# Patient Record
Sex: FEMALE | Race: ASIAN | NOT HISPANIC OR LATINO
[De-identification: names, ages, dates, MRNs, and addresses within clinical notes are randomized per-mention and may not be internally consistent; named-entity substitution may affect disease eponyms.]

---

## 2023-02-14 ENCOUNTER — APPOINTMENT (OUTPATIENT)
Dept: UROLOGY | Facility: CLINIC | Age: 33
End: 2023-02-14
Payer: COMMERCIAL

## 2023-02-14 VITALS
TEMPERATURE: 98.6 F | HEART RATE: 83 BPM | SYSTOLIC BLOOD PRESSURE: 119 MMHG | WEIGHT: 129 LBS | HEIGHT: 61 IN | BODY MASS INDEX: 24.35 KG/M2 | DIASTOLIC BLOOD PRESSURE: 78 MMHG

## 2023-02-14 PROBLEM — Z00.00 ENCOUNTER FOR PREVENTIVE HEALTH EXAMINATION: Status: ACTIVE | Noted: 2023-02-14

## 2023-02-14 PROCEDURE — 99204 OFFICE O/P NEW MOD 45 MIN: CPT

## 2023-02-14 PROCEDURE — 51798 US URINE CAPACITY MEASURE: CPT

## 2023-02-14 RX ORDER — FLUCONAZOLE 150 MG/1
150 TABLET ORAL
Qty: 1 | Refills: 0 | Status: ACTIVE | COMMUNITY
Start: 2023-02-14 | End: 1900-01-01

## 2023-02-14 RX ORDER — NITROFURANTOIN (MONOHYDRATE/MACROCRYSTALS) 25; 75 MG/1; MG/1
100 CAPSULE ORAL
Qty: 10 | Refills: 0 | Status: ACTIVE | COMMUNITY
Start: 2023-02-14 | End: 1900-01-01

## 2023-02-14 NOTE — LETTER BODY
[Dear  ___] : Dear  [unfilled], [Courtesy Letter:] : I had the pleasure of seeing your patient, [unfilled], in my office today. [Please see my note below.] : Please see my note below. [Consult Closing:] : Thank you very much for allowing me to participate in the care of this patient.  If you have any questions, please do not hesitate to contact me. [Sincerely,] : Sincerely, [FreeTextEntry3] : Maryjo Fragoso MD\par Director of Robotic Education\par [facility]\par \par [email or web address]\par [phone] ([place])\par [phone]  ([facility])

## 2023-02-14 NOTE — ASSESSMENT
[FreeTextEntry1] : 32 year old with recurrent UTIs, recurrent yeast infections, sometimes has symptoms and not found to be culture positives, other times are culture positive. Patient currently with symptoms and evidence of UTI on culture.\par \par Recurrent UTI is defined as two episodes of acute bacterial cystitis within six months or three episodes within one year. Patient does not have culture data here, but appears to meet criteria. Does have episodes of symptoms however where urine culture negative and symptoms resolve with treatment for yeast infection. 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 We discussed the data related to increase water intake and cranberry extract daily. We discussed antibiotic prophylaxis both continuous and post-coital. \par \par - continue increased water intake, daily cranberry extract\par - will initiate treatment for current UTI - culture returned positive this AM, will prescribe concurrent \par - f/u with gyn for eval for endo\par - cysto 4-6 weeks to evaluate symptoms and given MDR history\par - avoidance of constipation, avoidance of bladder triggers (caffeine, alcohol, spicy foods, citrus, carbonated beverages)\par - will consider referral to Dr. Morse

## 2023-02-14 NOTE — HISTORY OF PRESENT ILLNESS
[FreeTextEntry1] : Patient Name: Kim Alcazar\par Date of Birth: [birth year]\par Contact Number: [phone]\par ------------------------------------------------------------------------------\par Date of Initial Visit: 2/14/23\par Referring Provider/PCP: Dr. Maty Vazquez\par ------------------------------------------------------------------------------\par \par CC: recurrent UTI\par \par HPI: 32 year old female with history of endometriosis. Patient reports that ever since she has been sexually active she has been developing UTIs. Her UTI symptoms include burning at the end of urination, feels like not completely emptying, vulvar and urethral itching, creamy discharge. At times was positive for UTIs and if started antibiotics would get yeast infection. Patient reports given this, her gyn in [place] would prescribe prolonged diflucan, not antibiotics, and symptoms would resolve (including urinary symptoms without antibiotics)\par \par Patient moved to  5 years ago. Patient reports about 8 episodes a year of symptoms, though only half culture proven. No history of febrile UTI. No history of kidney stones. Reports episodes usually post-coital in nature, but not always. Last week patient reported onset of burning at end of urination, frequent urination, incomplete emptying, vulvar itching. Went to urgent care and urine positive for e. faecalis  - just returned this AM. Has not started antibiotics yet. No fevers or chills. BV screen was negative.\par \par Last week patient also had episode of gastritis and was in [facility] ED in [facility]. Had CT as part of work-up which showed: kidneys symmetric enhancement, no hydronephrosis, renal stone, or soft tissue attenuation mas. Pelvic viscera uterus retroflexed, adnexa and bladder unremarkable. Report does note retroverted uterus can be seen in endometriosis.\par \par No prior pregnancies. Does report significant pelvic pain around time of periods.Patient drinks 2 L water a day. Has tried daily cranberry extract ppx.\par \par Patient had endometriosis surgery at age 24. Patient had recent US and everything looked fine. Though CT above does note findings can be seen in endometriosis.\par \par  \par Labs:\par \par Urine culture 2/11/23:\par >100K e. faecalis S: amp, streptomycin, linezolid, daptomycin, vancomycin, tigecycline, nitrofurantoin\par R: gentamycin, cipro, levofloxacin, erythromycin, doxycycline, tetracycline\par \par Imaging:\par CT 2/7/223: po and IV contrast: kidneys symmetric enhancement, no hydronephrosis, renal stone, or soft tissue attenuation mass. 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

## 2023-02-14 NOTE — PHYSICAL EXAM
[General Appearance - Well Developed] : well developed [General Appearance - Well Nourished] : well nourished [Normal Appearance] : normal appearance [Well Groomed] : well groomed [General Appearance - In No Acute Distress] : no acute distress [Edema] : no peripheral edema [] : no respiratory distress [Respiration, Rhythm And Depth] : normal respiratory rhythm and effort [Exaggerated Use Of Accessory Muscles For Inspiration] : no accessory muscle use [Abdomen Soft] : soft [Abdomen Tenderness] : non-tender [Costovertebral Angle Tenderness] : no ~M costovertebral angle tenderness [Urethral Meatus] : normal urethra [Normal Station and Gait] : the gait and station were normal for the patient's age [FreeTextEntry1] : mild vulvar erythema

## 2023-03-05 ENCOUNTER — TRANSCRIPTION ENCOUNTER (OUTPATIENT)
Age: 33
End: 2023-03-05

## 2023-03-17 ENCOUNTER — NON-APPOINTMENT (OUTPATIENT)
Age: 33
End: 2023-03-17

## 2023-05-31 ENCOUNTER — APPOINTMENT (OUTPATIENT)
Dept: UROLOGY | Facility: CLINIC | Age: 33
End: 2023-05-31
Payer: COMMERCIAL

## 2023-05-31 VITALS
TEMPERATURE: 97.6 F | SYSTOLIC BLOOD PRESSURE: 111 MMHG | DIASTOLIC BLOOD PRESSURE: 76 MMHG | HEART RATE: 97 BPM | OXYGEN SATURATION: 98 %

## 2023-05-31 PROCEDURE — 52000 CYSTOURETHROSCOPY: CPT

## 2023-05-31 RX ORDER — FLUCONAZOLE 150 MG/1
150 TABLET ORAL
Qty: 1 | Refills: 0 | Status: ACTIVE | COMMUNITY
Start: 2023-05-31 | End: 1900-01-01

## 2023-05-31 NOTE — ASSESSMENT
[FreeTextEntry1] : 32 year old with recurrent UTIs, recurrent yeast infections, sometimes has symptoms and not found to be culture positives, other times are culture positive. Patient currently with symptoms and evidence of UTI on culture.\par \par Recurrent UTI is defined as two episodes of acute bacterial cystitis within six months or three episodes within one year. Patient does not have culture data here, but appears to meet criteria. Does have episodes of symptoms however where urine culture negative and symptoms resolve with treatment for yeast infection. 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 We discussed the data related to increase water intake and cranberry extract daily. We discussed antibiotic prophylaxis both continuous and post-coital. \par \par Given history MDR organisms decision made to work up - patient had prior CT which showed no  pathology, possible endometriosis. Cysto today notable for resistance to scope entry, but otherwise wnl.\par \par - continue increased water intake, daily cranberry extract\par - Management possible endometriosis and pelvic floor PT per gyn \par - avoidance of constipation, avoidance of bladder triggers (caffeine, alcohol, spicy foods, citrus, carbonated beverages)\par - f/u 6 months, sooner if any issues

## 2023-05-31 NOTE — HISTORY OF PRESENT ILLNESS
[FreeTextEntry1] : Patient Name: Kim Alcazar\par Date of Birth: [birth year]\par Contact Number: [phone]\par ------------------------------------------------------------------------------\par Date of Initial Visit: 2/14/23\par Referring Provider/PCP: Dr. Maty Vazquez\par ------------------------------------------------------------------------------\par \par CC: recurrent UTI\par \par HPI: 32 year old female with history of endometriosis. Patient reports that ever since she has been sexually active she has been developing UTIs. Her UTI symptoms include burning at the end of urination, feels like not completely emptying, vulvar and urethral itching, creamy discharge. At times was positive for UTIs and if started antibiotics would get yeast infection. Patient reports given this, her gyn in [place] would prescribe prolonged diflucan, not antibiotics, and symptoms would resolve (including urinary symptoms without antibiotics)\par \par Patient moved to  5 years ago. Patient reports about 8 episodes a year of symptoms, though only half culture proven. No history of febrile UTI. No history of kidney stones. Reports episodes usually post-coital in nature, but not always. Last week patient reported onset of burning at end of urination, frequent urination, incomplete emptying, vulvar itching. Went to urgent care and urine positive for e. faecalis - just returned this AM. Has not started antibiotics yet. No fevers or chills. BV screen was negative.\par \par Last week patient also had episode of gastritis and was in [facility] ED in [facility]. Had CT as part of work-up which showed: kidneys symmetric enhancement, no hydronephrosis, renal stone, or soft tissue attenuation mas. Pelvic viscera uterus retroflexed, adnexa and bladder unremarkable. Report does note retroverted uterus can be seen in endometriosis.\par \par No prior pregnancies. Does report significant pelvic pain around time of periods.Patient drinks 2 L water a day. Has tried daily cranberry extract ppx.\par \par Patient had endometriosis surgery at age 24. Patient had recent US and everything looked fine. Though CT above does note findings can be seen in endometriosis.\par \par  \par Labs:\par \par Urine culture 2/11/23:\par >100K e. faecalis S: amp, streptomycin, linezolid, daptomycin, vancomycin, tigecycline, nitrofurantoin\par R: gentamycin, cipro, levofloxacin, erythromycin, doxycycline, tetracycline\par \par Imaging:\par CT 2/7/223: po and IV contrast: kidneys symmetric enhancement, no hydronephrosis, renal stone, or soft tissue attenuation mass. Pelvic viscera uterus retroflexed, adnexa and bladder unremarkable\par \par PVR 0\par ----------------------------------------------------------------------------------------------------------------------------------------\par Interval History (05/31/2023):\par \par Since last visit patient starting increasing water intake and cranberry prophylaxis. Denies any UTIs since last visit or UTI symptoms. Has been worked up for recurrent endometriosis for severe pain around periods, but MRI was negative. 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

## 2023-05-31 NOTE — LETTER BODY
[Dear  ___] : Dear  [unfilled], [Courtesy Letter:] : I had the pleasure of seeing your patient, [unfilled], in my office today. [Please see my note below.] : Please see my note below. [Consult Closing:] : Thank you very much for allowing me to participate in the care of this patient.  If you have any questions, please do not hesitate to contact me. [Sincerely,] : Sincerely, [FreeTextEntry3] : Maryjo Fragoso MD\par Director of Robotic Education\par [facility] at [facility]\par \par [email or web address]\par [phone] ([place])\par [phone]  ([facility])

## 2023-09-08 ENCOUNTER — APPOINTMENT (OUTPATIENT)
Dept: UROLOGY | Facility: CLINIC | Age: 33
End: 2023-09-08
Payer: COMMERCIAL

## 2023-09-08 VITALS — DIASTOLIC BLOOD PRESSURE: 82 MMHG | TEMPERATURE: 98.8 F | HEART RATE: 82 BPM | SYSTOLIC BLOOD PRESSURE: 127 MMHG

## 2023-09-08 DIAGNOSIS — N39.0 URINARY TRACT INFECTION, SITE NOT SPECIFIED: ICD-10-CM

## 2023-09-08 PROCEDURE — 99213 OFFICE O/P EST LOW 20 MIN: CPT

## 2023-09-08 RX ORDER — FLUCONAZOLE 150 MG/1
150 TABLET ORAL AS DIRECTED
Qty: 2 | Refills: 0 | Status: ACTIVE | COMMUNITY
Start: 2023-09-08 | End: 1900-01-01

## 2023-09-08 RX ORDER — NITROFURANTOIN (MONOHYDRATE/MACROCRYSTALS) 25; 75 MG/1; MG/1
100 CAPSULE ORAL
Qty: 10 | Refills: 0 | Status: ACTIVE | COMMUNITY
Start: 2023-09-08 | End: 1900-01-01

## 2023-09-08 NOTE — HISTORY OF PRESENT ILLNESS
[FreeTextEntry1] : Patient Name: Kim Alcazar Date of Birth: [birth year] Contact Number: [phone] ------------------------------------------------------------------------------ Date of Initial Visit: 2/14/23 Referring Provider/PCP: GYN Dr. Maty Vazquez (f. [phone]) ------------------------------------------------------------------------------ CC: recurrent UTI  HPI: 32 year old female with history of endometriosis. Patient reports that ever since she has been sexually active she has been developing UTIs. Her UTI symptoms include burning at the end of urination, feels like not completely emptying, vulvar and urethral itching, creamy discharge. At times was positive for UTIs and if started antibiotics would get yeast infection. Patient reports given this, her gyn in [place] would prescribe prolonged diflucan, not antibiotics, and symptoms would resolve (including urinary symptoms without antibiotics)  Patient moved to  5 years ago. Patient reports about 8 episodes a year of symptoms, though only half culture proven. No history of febrile UTI. No history of kidney stones. Reports episodes usually post-coital in nature, but not always. Last week patient reported onset of burning at end of urination, frequent urination, incomplete emptying, vulvar itching. Went to urgent care and urine positive for e. faecalis - just returned this AM. Has not started antibiotics yet. No fevers or chills. BV screen was negative.  Last week patient also had episode of gastritis and was in [facility] ED in [facility]. Had CT as part of work-up which showed: kidneys symmetric enhancement, no hydronephrosis, renal stone, or soft tissue attenuation mas. Pelvic viscera uterus retroflexed, adnexa and bladder unremarkable. Report does note retroverted uterus can be seen in endometriosis.  No prior pregnancies. Does report significant pelvic pain around time of periods. Patient drinks 2 L water a day. Has tried daily cranberry extract ppx.  Patient had endometriosis surgery at age 24. Patient had recent US and everything looked fine. Though CT above does note findings can be seen in endometriosis.  Labs: Urine culture 2/11/23: >100K e. faecalis S: amp, streptomycin, linezolid, daptomycin, vancomycin, tigecycline, nitrofurantoin R: gentamycin, cipro, levofloxacin, erythromycin, doxycycline, tetracycline  Imaging: CT 2/7/223: po and IV contrast: kidneys symmetric enhancement, no hydronephrosis, renal stone, or soft tissue attenuation mass. Pelvic viscera uterus retroflexed, adnexa and bladder unremarkable  PVR 0 ---------------------------------------------------------------------------------------------------------------------------------------- Interval History (05/31/2023):  Since last visit patient starting increasing water intake and cranberry prophylaxis. Denies any UTIs since last visit or UTI symptoms. Has been worked up for recurrent endometriosis for severe pain around periods, but MRI was negative. Recommended for pelvic floor PT and suppression of periods.  Cysto today wnl but patient was resistant to scope entering. ---------------------------------------------------------------------------------------------------------------------------------------- Interval History (09/08/2023):  Patient report no UTIs since February. Has been on daily cranberry ppx and on period suppression. Yesterday AM patient was working out and sweaty and did not change her clothes for a bit. Shortly after felt pain at end of urination, itchiness and feeling of inflammation of vagina. + vaginal discharge. No fevers or chills. Unsure if feels more consistent with UTI versus yeast infection.  Patient is leaving to [facility] at 8PM for one week. 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

## 2023-09-08 NOTE — ASSESSMENT
[FreeTextEntry1] : 32 year old with recurrent UTIs, recurrent yeast infections, sometimes has symptoms and culture negative, other times culture positive. Patient currently presents with symptoms of UTI vs yeast infection and has flight tonight.  Recurrent UTI is defined as two episodes of acute bacterial cystitis within six months or three episodes within one year. Patient does not have culture data here, but appears to meet criteria. Does have episodes of symptoms however where urine culture negative and symptoms resolve with treatment for yeast infection. Patient does have history of MDR organisms. Given history MDR organisms decision made to work up - patient had prior CT which showed no  pathology, possible endometriosis. Cysto was notable for resistance to scope entry, but otherwise wnl.  - UA, urine culture - given traveling will prescribe course of macrobid and diflucan - will call patient with results and will hold off on abx until results - fu with gyn re recurrent yeast infections - continue increased water intake, daily cranberry extract - Management possible endometriosis and pelvic floor PT per gyn - avoidance of constipation, avoidance of bladder triggers (caffeine, alcohol, spicy foods, citrus, carbonated beverages) - f/u as planned

## 2023-09-08 NOTE — LETTER BODY
[Dear  ___] : Dear  [unfilled], [Courtesy Letter:] : I had the pleasure of seeing your patient, [unfilled], in my office today. [Please see my note below.] : Please see my note below. [Consult Closing:] : Thank you very much for allowing me to participate in the care of this patient.  If you have any questions, please do not hesitate to contact me. [Sincerely,] : Sincerely, [FreeTextEntry3] : Maryjo Fragoso MD Director of Robotic Education [facility]  [email or web address] [phone] ([place]) [phone]  ([facility])

## 2023-09-10 LAB
APPEARANCE: CLEAR
BACTERIA: NEGATIVE /HPF
BILIRUBIN URINE: NEGATIVE
BLOOD URINE: NEGATIVE
CAST: 0 /LPF
COLOR: YELLOW
EPITHELIAL CELLS: 5 /HPF
GLUCOSE QUALITATIVE U: NEGATIVE MG/DL
KETONES URINE: NEGATIVE MG/DL
LEUKOCYTE ESTERASE URINE: ABNORMAL
MICROSCOPIC-UA: NORMAL
NITRITE URINE: NEGATIVE
PH URINE: 7.5
PROTEIN URINE: NEGATIVE MG/DL
RED BLOOD CELLS URINE: 1 /HPF
SPECIFIC GRAVITY URINE: 1.01
UROBILINOGEN URINE: 0.2 MG/DL
WHITE BLOOD CELLS URINE: 3 /HPF

## 2023-09-12 ENCOUNTER — NON-APPOINTMENT (OUTPATIENT)
Age: 33
End: 2023-09-12

## 2023-09-12 LAB — BACTERIA UR CULT: NORMAL

## 2023-09-14 ENCOUNTER — TRANSCRIPTION ENCOUNTER (OUTPATIENT)
Age: 33
End: 2023-09-14

## 2023-11-07 ENCOUNTER — APPOINTMENT (OUTPATIENT)
Dept: UROLOGY | Facility: CLINIC | Age: 33
End: 2023-11-07